# Patient Record
Sex: MALE | Race: BLACK OR AFRICAN AMERICAN | NOT HISPANIC OR LATINO | URBAN - METROPOLITAN AREA
[De-identification: names, ages, dates, MRNs, and addresses within clinical notes are randomized per-mention and may not be internally consistent; named-entity substitution may affect disease eponyms.]

---

## 2020-02-02 ENCOUNTER — EMERGENCY (EMERGENCY)
Facility: HOSPITAL | Age: 25
LOS: 1 days | Discharge: HOME | End: 2020-02-02
Attending: EMERGENCY MEDICINE | Admitting: EMERGENCY MEDICINE
Payer: SELF-PAY

## 2020-02-02 VITALS
HEART RATE: 86 BPM | OXYGEN SATURATION: 100 % | SYSTOLIC BLOOD PRESSURE: 145 MMHG | DIASTOLIC BLOOD PRESSURE: 85 MMHG | RESPIRATION RATE: 18 BRPM | TEMPERATURE: 99 F

## 2020-02-02 PROCEDURE — 73610 X-RAY EXAM OF ANKLE: CPT | Mod: 26,LT

## 2020-02-02 PROCEDURE — 99283 EMERGENCY DEPT VISIT LOW MDM: CPT

## 2020-02-02 RX ORDER — IBUPROFEN 200 MG
600 TABLET ORAL ONCE
Refills: 0 | Status: COMPLETED | OUTPATIENT
Start: 2020-02-02 | End: 2020-02-02

## 2020-02-02 RX ADMIN — Medication 600 MILLIGRAM(S): at 12:01

## 2020-02-02 NOTE — ED PROVIDER NOTE - PATIENT PORTAL LINK FT
You can access the FollowMyHealth Patient Portal offered by [facility] by registering at the following website: [email or web address]. By joining [email or web address]’s FollowMyHealth portal, you will also be able to view your health information using other applications (apps) compatible with our system.

## 2020-02-02 NOTE — ED PROVIDER NOTE - NSFOLLOWUPCLINICS_GEN_ALL_ED_FT
[facility]  Sports Medicine  [address] [place] [address]  Phone: [phone]  Fax:   Follow Up Time: 7-10 Days

## 2020-02-02 NOTE — ED PROVIDER NOTE - CLINICAL SUMMARY MEDICAL DECISION MAKING FREE TEXT BOX
25 y/o male with no PMH presenting s/p eversion ankle injury. Will obtain xr to r/o fracture and dose ibuprofen. Reassess, likely d/c home with air cast and crutches.

## 2020-02-02 NOTE — ED ADULT NURSE NOTE - CHIEF COMPLAINT QUOTE
pt reports twisting L ankle walking down the stairs yesterday, skipping one stair. DId not fall down the stairs. reports pain and swelling to lateral ankle

## 2020-02-02 NOTE — ED PROVIDER NOTE - OBJECTIVE STATEMENT
23 y/o male with no PMH presents to the ED c/o left ankle pain onset yesterday morning. Reports he was walking down steps and missed one stair, sustained an inversion ankle sprain of the left ankle. Wrapped the ankle with an ACE wrap and has been ambulating on it without issue. Today reports increased pain and swelling. No numbness or tingling. No other injuries noted. Did not take pain medication prior to arrival. 25 y/o male with no PMH presents to the ED c/o left ankle pain onset yesterday morning. Reports he was walking down steps and missed one stair, sustained an inversion ankle sprain of the left ankle. Wrapped the ankle with an ACE wrap and has been ambulating on it without issue. Today reports increased pain and swelling. No numbness or tingling. No other injuries noted. Did not take pain medication prior to arrival.  Attending - Agree with above.  I evaluated patient myself. 25 y/o M with mother at bedside.  c/o eversion injury to left ankle while walking down stairs yesterday.  Did not fall.  Denies other trauma.  Noted continued swelling and pain today.  Has not taken any pain medicine.

## 2020-02-02 NOTE — ED ADULT NURSE NOTE - NSIMPLEMENTINTERV_GEN_ALL_ED
Implemented All Universal Safety Interventions:  [place] to call system. Call bell, personal items and telephone within reach. Instruct patient to call for assistance. Room bathroom lighting operational. Non-slip footwear when patient is off stretcher. Physically safe environment: no spills, clutter or unnecessary equipment. Stretcher in lowest position, wheels locked, appropriate side rails in place.

## 2020-02-02 NOTE — ED ADULT NURSE NOTE - OBJECTIVE STATEMENT
Patient skipped step and twisted left ankle. Swelling noted to area. Patient report not being able to support his weight on his left foot. patient for x-ray. Will continue to monitor.

## 2020-02-02 NOTE — ED PROVIDER NOTE - PHYSICAL EXAMINATION
GENERAL: Awake, alert, NAD  HEENT: NC/AT, moist mucous membranes  LUNGS: CTAB, no wheezes or crackles   CARDIAC: RRR, no m/r/g  EXT: left ankle mild-moderately swollen with tenderness over medial malleolus, no tenderness over foot or proximal tib/fib, 2+ dp pulses  NEURO: A&Ox3. Moving all extremities.  SKIN: Warm and dry. No rash.  PSYCH: Normal affect. GENERAL: Awake, alert, NAD  HEENT: NC/AT, moist mucous membranes  LUNGS: CTAB, no wheezes or crackles   CARDIAC: RRR, no m/r/g  EXT: left ankle mild-moderately swollen with tenderness over medial malleolus, no tenderness over foot or proximal tib/fib, 2+ dp pulses  NEURO: A&Ox3. Moving all extremities.  SKIN: Warm and dry. No rash.  PSYCH: Normal affect.  ATTENDING PHYSICAL EXAM  GEN - NAD; well appearing; A+O x3  HEAD - NC/AT; EYES/NOSE - PERRL, EOMI, mucous membranes moist, no discharge  NECK: Neck supple, non-tender without lymphadenopathy, no masses, no JVD  PULMONARY - CTA b/l, symmetric breath sounds  CARDIAC -s1s2, RRR, no M,R,G  ABDOMEN - +BS, ND, NT, soft, no guarding, no rebound, no masses   BACK - no CVA tenderness, No vertebral or paravertebral tenderness  EXTREMITIES - noted mild left ankle swelling, no deformity.  +TTP of lateral mal.  No foot TTP.  PMS fully intact.

## 2020-02-02 NOTE — ED PROVIDER NOTE - NS ED ROS FT
CONST: no fevers, no chills  EYES: no pain, no vision changes  ENT: no sore throat, no ear pain, no change in hearing  CV: no chest pain, no leg swelling  RESP: no shortness of breath, no cough  ABD: no abdominal pain, no nausea, no vomiting, no diarrhea  : no dysuria, no flank pain, no hematuria  MSK: no back pain, + extremity pain  NEURO: no headache or additional neurologic complaints  HEME: no easy bleeding  SKIN:  no rash
